# Patient Record
(demographics unavailable — no encounter records)

---

## 2025-01-10 NOTE — DISCUSSION/SUMMARY
[FreeTextEntry1] : 59 YEAR OLD FEMALE LMP 2020 WITH HISTORY OF HYSTERECTOMY FOR FIBROIDS AND ADENOMYOSIS, PRESENTS FOR WELL WOMAN ANNUAL GYNECOLOGIC EXAMINATION AND PAP.  LAST SEEN FOR WELL WOMAN VISIT 7/3/2023; PAP AND HPV HR TESTING DONE AT THAT TIME WERE NEGATIVE.  NO NEW MEDICAL OR SURGICAL HISTORY SINCE LAST VISIT. MEDICATIONS; LOSARTAN/HCTZ FOR HYPERTENSION MEDICATION ALLERGIES-PENICILLIN CAUSED A RASH ROS;BMS-NROMAL; NO FAM HISTORY OF COLON CANCER; LAST COLNOSOCPY 1/2021          NO URINARY COMPLAINTS EXCEPT FOR STRESS INCONTINENCE, UNCHANGED          NOT SEXUALLY ACTIVE. BREASTS; DOES BREAST SELF EXAMINATION                    LAST MAMMOGRAPHY WAS DONE 11/9/2024 AT [facility] RADIOLOGY -"OK"                    NO FAM HISTORY OF BREAST CANCER +EXERCISES SOMEWHAT; + MULTIVITAMINS; + DAIRY; NO TOBACCO OR VAPING; VIT B12 SHOT FRACTURE HISTORY; NONE NO FM HISTORY OF OTEOPOROSIS LAST BONE DENSITY TESTING DONE 6/25/2022 WAS NORMAL  PE;/82; TEMP 98.1; WEIGHT 196 LB ;HEIGHT 5'5"       BREASTS; NO MASSES OR DISCHARGES; REDUCTION SCARS       ABDOMEN;SOFT, NO MASSES; NO TENDERNESS TO PALPATION       PELVIC NORMAL EXTERNAL GENITALIA                     NORMAL URETHRAL MEATUS                     NORMAL LABIA MAJORA AND LABIA MINORA                     NORMAL VAGINA AND NORMAL VAGINAL VAULT                     NO PALPABLE ADNEXAL MASSES  IMP; WELL WOMAN          MENOPAUSE          STRESS URINARY INCONTINENCE          ESSENTIAL HYPERTENSION          HISTORY OF HYSTERECTOMY  PLAN; THIN PREP PAP WITH HR HPV TESTING DONE-PT TO CALL IN 2 WKS FOR RESULTS             BREAST SELF EXAMINATION MONTHLY CONTINUED TO BE STRONGLY ADVISED             ANNUAL MAMMOGRPAHY ADVISED AND REFERRAL GIVEN FOR TESTING NOW             BONE DENSITY TESTING ADVISED AND REFERRAL SCRIPT GIVEN             RETURN TO OFFICE ONE YEAR OR PRN